# Patient Record
Sex: FEMALE | Race: WHITE | Employment: FULL TIME | URBAN - METROPOLITAN AREA
[De-identification: names, ages, dates, MRNs, and addresses within clinical notes are randomized per-mention and may not be internally consistent; named-entity substitution may affect disease eponyms.]

---

## 2023-08-14 ENCOUNTER — OFFICE VISIT (OUTPATIENT)
Dept: PRIMARY CARE | Facility: CLINIC | Age: 58
End: 2023-08-14
Payer: COMMERCIAL

## 2023-08-14 VITALS
WEIGHT: 124.6 LBS | BODY MASS INDEX: 19.56 KG/M2 | OXYGEN SATURATION: 97 % | HEART RATE: 56 BPM | HEIGHT: 67 IN | SYSTOLIC BLOOD PRESSURE: 122 MMHG | DIASTOLIC BLOOD PRESSURE: 70 MMHG

## 2023-08-14 DIAGNOSIS — Z01.818 PREOPERATIVE CLEARANCE: Primary | ICD-10-CM

## 2023-08-14 PROCEDURE — 1036F TOBACCO NON-USER: CPT | Performed by: NURSE PRACTITIONER

## 2023-08-14 PROCEDURE — 99213 OFFICE O/P EST LOW 20 MIN: CPT | Performed by: NURSE PRACTITIONER

## 2023-08-14 RX ORDER — GLUCOSAMINE/CHONDRO SU A 500-400 MG
1 TABLET ORAL 2 TIMES DAILY
COMMUNITY

## 2023-08-14 RX ORDER — FOLIC ACID 1 MG/1
TABLET ORAL DAILY
COMMUNITY

## 2023-08-14 RX ORDER — FERROUS SULFATE 325(65) MG
65 TABLET ORAL
COMMUNITY

## 2023-08-14 RX ORDER — PHENYTOIN SODIUM 100 MG/1
100 CAPSULE, EXTENDED RELEASE ORAL 2 TIMES DAILY
COMMUNITY

## 2023-08-14 RX ORDER — MULTIVITAMIN
1 TABLET ORAL DAILY
COMMUNITY

## 2023-08-14 RX ORDER — GABAPENTIN 300 MG/1
300 CAPSULE ORAL 3 TIMES DAILY
COMMUNITY

## 2023-08-14 ASSESSMENT — ENCOUNTER SYMPTOMS
NEUROLOGICAL NEGATIVE: 1
CARDIOVASCULAR NEGATIVE: 1
GASTROINTESTINAL NEGATIVE: 1
ALLERGIC/IMMUNOLOGIC NEGATIVE: 1
HEMATOLOGIC/LYMPHATIC NEGATIVE: 1
RESPIRATORY NEGATIVE: 1
MUSCULOSKELETAL NEGATIVE: 1
PSYCHIATRIC NEGATIVE: 1
CONSTITUTIONAL NEGATIVE: 1
ENDOCRINE NEGATIVE: 1

## 2023-08-14 NOTE — PROGRESS NOTES
"Subjective   Patient ID: Marly Garcia is a 58 y.o. female who presents for Pre-op Exam (LOV 2021 /NOV-None scheduled/8/25/23 dr mehta. Both eye lids).    HPI   Patient of Dr. Loyola here for preoperative clearance. Last seen by Dr. Loyola on 2021.   Current concern:  1) preoperative clearance for upper lid (bilateral) blepharoplasty scheduled on 08/25/2023 with Dr. Mehta.   Chronic concerns: Varicose veins,   Specialist  - GYN- annually   - Dermatologist   - Hematology  for low platelet count   - Neurology Dr. Blanc Had AVM surgery, seizure prior to this surgery, no seizures since.   - Vascular Surgeon  Labs 04/2023   NON SMOKER  Runs 4 miles 5 days a week. Although this week pulled hamstring and not running.     Review of Systems   Constitutional: Negative.    HENT: Negative.     Eyes:         As noted in HPI.    Respiratory: Negative.     Cardiovascular: Negative.    Gastrointestinal: Negative.    Endocrine: Negative.    Genitourinary: Negative.    Musculoskeletal: Negative.    Skin: Negative.    Allergic/Immunologic: Negative.    Neurological: Negative.    Hematological: Negative.    Psychiatric/Behavioral: Negative.         Objective   /70 (BP Location: Right arm, Patient Position: Sitting, BP Cuff Size: Adult)   Pulse 56   Ht 1.702 m (5' 7\")   Wt 56.5 kg (124 lb 9.6 oz)   SpO2 97%   BMI 19.52 kg/m²     Physical Exam  Vitals reviewed.   Constitutional:       Appearance: Normal appearance.   HENT:      Nose: Nose normal.      Mouth/Throat:      Mouth: Mucous membranes are moist.   Eyes:      Pupils: Pupils are equal, round, and reactive to light.   Neck:      Thyroid: No thyromegaly.      Vascular: No carotid bruit.   Cardiovascular:      Rate and Rhythm: Normal rate and regular rhythm.      Pulses:           Dorsalis pedis pulses are 2+ on the right side and 2+ on the left side.      Heart sounds: Normal heart sounds.   Pulmonary:      Effort: Pulmonary effort is normal.      Breath sounds: Normal " breath sounds.   Musculoskeletal:         General: Normal range of motion.      Cervical back: Normal range of motion.   Skin:     General: Skin is warm.   Neurological:      General: No focal deficit present.      Mental Status: She is alert.   Psychiatric:         Mood and Affect: Mood normal.         Behavior: Behavior normal.         Judgment: Judgment normal.       Assessment/Plan   Diagnoses and all orders for this visit:  Preoperative clearance- printed labs- does at [facility] [employer].   -     Basic metabolic panel; Future  -     CBC; Future    Plan: follow up yearly with Dr. Loyola for physical exam.   Will fax form to Dr. Mehta office once labs are reviewed, will let patient know results and that form was faxed.

## 2023-08-15 LAB
CALCIUM (MG/DL) IN SER/PLAS EXTERNAL: 9.5 MG/DL
CARBON DIOXIDE, TOTAL (MMOL/L) IN SER/PLAS EXTERNAL: 32 MMOL/L
CHLORIDE (MMOL/L) IN SER/PLAS EXTERNAL: 102 MMOL/L
CREATININE (MG/DL) IN SER/PLAS EXTERNAL: 0.68 MG/DL
ERYTHROCYTE DISTRIBUTION WIDTH (RATIO) BY AUTOMATED COUNT EXTERNAL: 11.9 %
ERYTHROCYTE MEAN CORPUSCULAR HEMOGLOBIN (PG) BY AUTOMATED COUNT EXTERNAL: 30.4 PG
ERYTHROCYTE MEAN CORPUSCULAR HGB CONCENTRATION (G/DL) BY AUTOMATED EXT: 33.6 G/DL
ERYTHROCYTE MEAN CORPUSCULAR VOLUME (FL) BY AUTOMATED COUNT EXTERNAL: 81 FL
ERYTHROCYTES (10*6/UL) IN BLOOD BY AUTOMATED COUNT EXTERNAL: 4.74 X10*6/UL
GLOMERULAR FILTRATION RATE ML/MIN/1.73 SQ M.PREDICTED EXTERNAL: 100 ML/MIN/1.73M*2
GLUCOSE (MG/DL) IN SER/PLAS EXTERNAL: 88 MG/DL
HEMATOCRIT (%) IN BLOOD BY AUTOMATED COUNT EXTERNAL: 42.9 %
HEMOGLOBIN (G/DL) IN BLOOD EXTERNAL: 14.4 G/DL
LEUKOCYTES (10*3/UL) IN BLOOD BY AUTOMATED COUNT EXTERNAL: 4.2 X10*3/UL
PLATELET MEAN VOLUME (FL) IN BLOOD BY AUTOMATED COUNT EXTERNAL: 12.4 FL
PLATELETS (10*3/UL) IN BLOOD AUTOMATED COUNT EXTERNAL: 133 X10*3/UL
POTASSIUM (MMOL/L) IN SER/PLAS EXTERNAL: 5 MMOL/L
SODIUM (MMOL/L) IN SER/PLAS EXTERNAL: 141 MMOL/L
UREA NITROGEN (MG/DL) IN SER/PLAS EXTERNAL: 15 MG/DL

## 2024-03-11 PROBLEM — Z85.828 HISTORY OF BASAL CELL CANCER: Status: ACTIVE | Noted: 2019-01-23

## 2024-03-11 PROBLEM — G56.01 CARPAL TUNNEL SYNDROME OF RIGHT WRIST: Status: ACTIVE | Noted: 2018-12-19

## 2024-03-11 PROBLEM — M67.441 DIGITAL MUCINOUS CYST OF FINGER OF RIGHT HAND: Status: ACTIVE | Noted: 2019-10-02

## 2024-03-11 PROBLEM — S62.639B OPEN FRACTURE OF TUFT OF DISTAL PHALANX OF FINGER: Status: ACTIVE | Noted: 2017-08-30

## 2024-03-11 PROBLEM — D69.3 IMMUNE THROMBOCYTOPENIA (MULTI): Chronic | Status: ACTIVE | Noted: 2019-03-20

## 2024-03-11 RX ORDER — ACETYLGLUCOSAMINE 700 MG
CAPSULE ORAL
COMMUNITY

## 2024-03-11 RX ORDER — CHOLECALCIFEROL (VITAMIN D3) 50 MCG
TABLET ORAL
COMMUNITY

## 2024-03-12 ENCOUNTER — OFFICE VISIT (OUTPATIENT)
Dept: PRIMARY CARE | Facility: CLINIC | Age: 59
End: 2024-03-12
Payer: COMMERCIAL

## 2024-03-12 VITALS
SYSTOLIC BLOOD PRESSURE: 110 MMHG | BODY MASS INDEX: 19.62 KG/M2 | WEIGHT: 125 LBS | HEART RATE: 67 BPM | OXYGEN SATURATION: 97 % | HEIGHT: 67 IN | TEMPERATURE: 97.1 F | DIASTOLIC BLOOD PRESSURE: 60 MMHG

## 2024-03-12 DIAGNOSIS — M51.36 LUMBAR DEGENERATIVE DISC DISEASE: Primary | ICD-10-CM

## 2024-03-12 DIAGNOSIS — G40.909 SEIZURE DISORDER (MULTI): ICD-10-CM

## 2024-03-12 DIAGNOSIS — M54.10 RADICULOPATHY, UNSPECIFIED SPINAL REGION: ICD-10-CM

## 2024-03-12 PROCEDURE — 99213 OFFICE O/P EST LOW 20 MIN: CPT | Performed by: INTERNAL MEDICINE

## 2024-03-12 PROCEDURE — 1036F TOBACCO NON-USER: CPT | Performed by: INTERNAL MEDICINE

## 2024-03-12 NOTE — PROGRESS NOTES
"Subjective   Patient ID: Marly Garcia is a 59 y.o. female who presents for Pre-op Clearance (Pt is due for colonoscopy ).    HPI preop clearance microdiscectomy  Radicular sxs lle to lateral calf  No phx dvt, surgical problems  Otherwise feels well  Avid runner bf back issue w/o problems or cv sxs  No szs in decades    Review of Systems  As above    Objective   /60   Pulse 67   Temp 36.2 °C (97.1 °F)   Ht 1.702 m (5' 7\")   Wt 56.7 kg (125 lb)   SpO2 97%   BMI 19.58 kg/m²     Physical Exam  Gen nad, affect wnl  Heentt eomfg, face symmetric, ncat  Neck w/o la, tm, bruit  Lungs clear   Cv rrr nl s1, s2  Ext w/o edema  Neuro grossly nonfocal  Skin good color    Assessment/Plan   Diagnoses and all orders for this visit:  Lumbar degenerative disc disease  Radiculopathy, unspecified spinal region  Seizure disorder (CMS/HCC)     Doing well  Low surgical risk in my opinion  Form done and faxed  "